# Patient Record
Sex: FEMALE | Race: OTHER | HISPANIC OR LATINO | URBAN - METROPOLITAN AREA
[De-identification: names, ages, dates, MRNs, and addresses within clinical notes are randomized per-mention and may not be internally consistent; named-entity substitution may affect disease eponyms.]

---

## 2018-04-01 ENCOUNTER — OUTPATIENT (OUTPATIENT)
Dept: OUTPATIENT SERVICES | Facility: HOSPITAL | Age: 43
LOS: 1 days | End: 2018-04-01
Payer: MEDICAID

## 2018-04-01 PROCEDURE — G9001: CPT

## 2018-04-14 DIAGNOSIS — R69 ILLNESS, UNSPECIFIED: ICD-10-CM

## 2019-12-16 ENCOUNTER — APPOINTMENT (OUTPATIENT)
Dept: SURGERY | Facility: CLINIC | Age: 44
End: 2019-12-16
Payer: MEDICAID

## 2019-12-16 VITALS
OXYGEN SATURATION: 98 % | DIASTOLIC BLOOD PRESSURE: 78 MMHG | BODY MASS INDEX: 26.03 KG/M2 | WEIGHT: 124 LBS | SYSTOLIC BLOOD PRESSURE: 114 MMHG | HEART RATE: 59 BPM | HEIGHT: 58 IN

## 2019-12-16 DIAGNOSIS — D64.9 ANEMIA, UNSPECIFIED: ICD-10-CM

## 2019-12-16 DIAGNOSIS — Z78.9 OTHER SPECIFIED HEALTH STATUS: ICD-10-CM

## 2019-12-16 DIAGNOSIS — K80.20 CALCULUS OF GALLBLADDER W/OUT CHOLECYSTITIS W/OUT OBSTRUCTION: ICD-10-CM

## 2019-12-16 PROCEDURE — 99203 OFFICE O/P NEW LOW 30 MIN: CPT

## 2019-12-16 NOTE — CONSULT LETTER
[Dear  ___] : Dear  [unfilled], [Consult Letter:] : I had the pleasure of evaluating your patient, [unfilled]. [Please see my note below.] : Please see my note below. [Sincerely,] : Sincerely, [Consult Closing:] : Thank you very much for allowing me to participate in the care of this patient.  If you have any questions, please do not hesitate to contact me. [FreeTextEntry3] : Reji Shine MD, FACS  [DrFaheem  ___] : Dr. BELL

## 2019-12-16 NOTE — PHYSICAL EXAM
[Alert] : alert [Oriented to Person] : oriented to person [Oriented to Place] : oriented to place [Oriented to Time] : oriented to time [Calm] : calm [de-identified] : She  is alert, well-groomed, and cheerful.\par   [de-identified] :   anicteric.  Nasal mucosa pink, septum midline. Oral mucosa pink.  Tongue midline, Pharynx without exudates.\par   [de-identified] :  Neck supple. Trachea midline. Thyroid isthmus barely palpable, lobes not felt.\par   [de-identified] :  mild right upper quadrant tenderness with no guarding and no rebound.

## 2019-12-16 NOTE — PLAN
[FreeTextEntry1] : Ms. ALLEN  was told significance of findings, options, risks and benefits were explained.  Informed consent for laparoscopic/possible open  cholecystectomy and potential risks, benefits and alternatives (surgical options were discussed including non-surgical options or the option of no surgery) to the planned surgery were discussed in depth.  All surgical options were discussed including non-surgical treatments.  She wishes to proceed with surgery.  We will plan for surgery on at the next available date, pending any required insurance pre-certification or pre-approval. She agrees to obtain any necessary pre-operative evaluations and testing prior to surgery.\par Patient advised to seek immediate medical attention with any acute change in symptoms or with the development of any new or worsening symptoms.  Patient's questions and concerns addressed to patient's satisfaction, and patient verbalized an understanding of the information discussed.\par \par

## 2019-12-16 NOTE — HISTORY OF PRESENT ILLNESS
[de-identified] : Ms. MATHIEU ALLEN is a 44 year  old patient who was referred by Dr. Ronald Shine  and Lisseth Uriarte with the chief complaint of having right upper quadrant and epigastric pain for 1 year.  Pain is  radiating to the back.  She reports no nausea or vomiting and no history of jaundice, acholia or choluria.   Appetite is good and weight is stable.   She   has  family history of biliary tract disease in her cousin and nephew.  She had an abdominal sonogram on  11/07/2019 which revealed multiple GB stones. CBD is normal \par \par

## 2019-12-18 ENCOUNTER — OUTPATIENT (OUTPATIENT)
Dept: OUTPATIENT SERVICES | Facility: HOSPITAL | Age: 44
LOS: 1 days | End: 2019-12-18
Payer: MEDICAID

## 2019-12-18 VITALS
HEART RATE: 72 BPM | RESPIRATION RATE: 16 BRPM | SYSTOLIC BLOOD PRESSURE: 116 MMHG | WEIGHT: 123.9 LBS | DIASTOLIC BLOOD PRESSURE: 73 MMHG | OXYGEN SATURATION: 100 % | TEMPERATURE: 98 F | HEIGHT: 58 IN

## 2019-12-18 DIAGNOSIS — K81.9 CHOLECYSTITIS, UNSPECIFIED: ICD-10-CM

## 2019-12-18 DIAGNOSIS — Z98.82 BREAST IMPLANT STATUS: Chronic | ICD-10-CM

## 2019-12-18 DIAGNOSIS — Z01.818 ENCOUNTER FOR OTHER PREPROCEDURAL EXAMINATION: ICD-10-CM

## 2019-12-18 LAB
BLD GP AB SCN SERPL QL: SIGNIFICANT CHANGE UP
HCG UR QL: NEGATIVE — SIGNIFICANT CHANGE UP
HCT VFR BLD CALC: 36.2 % — SIGNIFICANT CHANGE UP (ref 34.5–45)
HGB BLD-MCNC: 11.5 G/DL — SIGNIFICANT CHANGE UP (ref 11.5–15.5)
MCHC RBC-ENTMCNC: 28.3 PG — SIGNIFICANT CHANGE UP (ref 27–34)
MCHC RBC-ENTMCNC: 31.8 GM/DL — LOW (ref 32–36)
MCV RBC AUTO: 88.9 FL — SIGNIFICANT CHANGE UP (ref 80–100)
NRBC # BLD: 0 /100 WBCS — SIGNIFICANT CHANGE UP (ref 0–0)
PLATELET # BLD AUTO: 300 K/UL — SIGNIFICANT CHANGE UP (ref 150–400)
RBC # BLD: 4.07 M/UL — SIGNIFICANT CHANGE UP (ref 3.8–5.2)
RBC # FLD: 14.6 % — HIGH (ref 10.3–14.5)
WBC # BLD: 6.76 K/UL — SIGNIFICANT CHANGE UP (ref 3.8–10.5)
WBC # FLD AUTO: 6.76 K/UL — SIGNIFICANT CHANGE UP (ref 3.8–10.5)

## 2019-12-18 PROCEDURE — G0463: CPT

## 2019-12-18 NOTE — H&P PST ADULT - HISTORY OF PRESENT ILLNESS
45 y/o female with iron deficiency anemia due to chronic blood loss secondary to leiomyoma of uterus , now diagnosed with cholecystitis is here today for presurgical evaluation. She is scheduled for laparoscopic cholecystectomy with intra operative cholangiogram possible open 12/20/2019 45 y/o female with iron deficiency anemia due to chronic blood loss secondary to leiomyoma of uterus, now diagnosed with cholecystitis is here today for presurgical evaluation. She is scheduled for laparoscopic cholecystectomy with intra operative cholangiogram possible open 12/20/2019

## 2019-12-18 NOTE — H&P PST ADULT - NSICDXPASTMEDICALHX_GEN_ALL_CORE_FT
PAST MEDICAL HISTORY:  Iron deficiency anemia due to chronic blood loss     No pertinent past medical history     Uterine leiomyoma

## 2019-12-18 NOTE — H&P PST ADULT - PRO PAIN LIFE ADAPT
decreased activity level/inability to sleep/withdrawal from social contact/inability or reluctance to perform ADLs/inability to work

## 2019-12-18 NOTE — H&P PST ADULT - NEGATIVE GENERAL SYMPTOMS
no sweating/no anorexia/no weight loss/no polydipsia/no fatigue/no malaise/no fever/no chills/no weight gain/no polyphagia/no polyuria

## 2019-12-18 NOTE — H&P PST ADULT - ASSESSMENT
45 y/o female with iron deficiency anemia due to chronic blood loss secondary to leiomyoma of uterus , now diagnosed with cholecystitis scheduled for laparoscopic cholecystectomy with intra operative cholangiogram possible open 12/20/2019 45 y/o female with iron deficiency anemia due to chronic blood loss secondary to leiomyoma of uterus, now diagnosed with cholecystitis scheduled for laparoscopic cholecystectomy with intra operative cholangiogram possible open 12/20/2019

## 2019-12-18 NOTE — H&P PST ADULT - GASTROINTESTINAL DETAILS
soft/no distention/no masses palpable/nontender/no bruit/no rebound tenderness/no rigidity/bowel sounds normal/no organomegaly/no guarding no organomegaly/no masses palpable/bowel sounds normal/no bruit/no guarding/no distention/soft/no rebound tenderness/no rigidity

## 2019-12-18 NOTE — H&P PST ADULT - RS GEN PE MLT RESP DETAILS PC
breath sounds equal/no wheezes/clear to auscultation bilaterally/no intercostal retractions/no rales/no chest wall tenderness/no rhonchi/no subcutaneous emphysema/airway patent/good air movement/respirations non-labored

## 2019-12-18 NOTE — H&P PST ADULT - NSICDXPROBLEM_GEN_ALL_CORE_FT
PROBLEM DIAGNOSES  Problem: Cholecystitis  Assessment and Plan: Scheduled for laparoscopic cholecystectomy with intra operative cholangiogram possible open 12/20/2019. Prepoperative instructions discussed and given to patient. Verbalized understanding of same PROBLEM DIAGNOSES  Problem: Cholecystitis  Assessment and Plan: Scheduled for laparoscopic cholecystectomy with intra operative cholangiogram possible open 12/20/2019. Preoperative instructions discussed and given to patient. Verbalized understanding of same

## 2019-12-18 NOTE — H&P PST ADULT - NSANTHOSAYNRD_GEN_A_CORE
No. JAGJIT screening performed.  STOP BANG Legend: 0-2 = LOW Risk; 3-4 = INTERMEDIATE Risk; 5-8 = HIGH Risk

## 2019-12-20 ENCOUNTER — RESULT REVIEW (OUTPATIENT)
Age: 44
End: 2019-12-20

## 2019-12-20 ENCOUNTER — APPOINTMENT (OUTPATIENT)
Dept: SURGERY | Facility: HOSPITAL | Age: 44
End: 2019-12-20

## 2019-12-20 ENCOUNTER — OUTPATIENT (OUTPATIENT)
Dept: OUTPATIENT SERVICES | Facility: HOSPITAL | Age: 44
LOS: 1 days | End: 2019-12-20
Payer: MEDICAID

## 2019-12-20 VITALS
OXYGEN SATURATION: 100 % | WEIGHT: 123.9 LBS | HEIGHT: 58 IN | DIASTOLIC BLOOD PRESSURE: 71 MMHG | RESPIRATION RATE: 16 BRPM | HEART RATE: 69 BPM | SYSTOLIC BLOOD PRESSURE: 117 MMHG | TEMPERATURE: 98 F

## 2019-12-20 VITALS
HEART RATE: 66 BPM | RESPIRATION RATE: 16 BRPM | TEMPERATURE: 98 F | DIASTOLIC BLOOD PRESSURE: 74 MMHG | OXYGEN SATURATION: 98 % | SYSTOLIC BLOOD PRESSURE: 109 MMHG

## 2019-12-20 DIAGNOSIS — Z01.818 ENCOUNTER FOR OTHER PREPROCEDURAL EXAMINATION: ICD-10-CM

## 2019-12-20 DIAGNOSIS — Z98.82 BREAST IMPLANT STATUS: Chronic | ICD-10-CM

## 2019-12-20 DIAGNOSIS — K81.9 CHOLECYSTITIS, UNSPECIFIED: ICD-10-CM

## 2019-12-20 LAB — BLD GP AB SCN SERPL QL: SIGNIFICANT CHANGE UP

## 2019-12-20 PROCEDURE — 47563 LAPARO CHOLECYSTECTOMY/GRAPH: CPT | Mod: AS

## 2019-12-20 PROCEDURE — 86850 RBC ANTIBODY SCREEN: CPT

## 2019-12-20 PROCEDURE — 47563 LAPARO CHOLECYSTECTOMY/GRAPH: CPT

## 2019-12-20 PROCEDURE — 36415 COLL VENOUS BLD VENIPUNCTURE: CPT

## 2019-12-20 PROCEDURE — 86901 BLOOD TYPING SEROLOGIC RH(D): CPT

## 2019-12-20 PROCEDURE — 88304 TISSUE EXAM BY PATHOLOGIST: CPT | Mod: 26

## 2019-12-20 PROCEDURE — 86900 BLOOD TYPING SEROLOGIC ABO: CPT

## 2019-12-20 PROCEDURE — 76000 FLUOROSCOPY <1 HR PHYS/QHP: CPT

## 2019-12-20 PROCEDURE — 88304 TISSUE EXAM BY PATHOLOGIST: CPT

## 2019-12-20 RX ORDER — DOCUSATE SODIUM 100 MG
1 CAPSULE ORAL
Qty: 0 | Refills: 0 | DISCHARGE

## 2019-12-20 RX ORDER — SODIUM CHLORIDE 9 MG/ML
1000 INJECTION, SOLUTION INTRAVENOUS
Refills: 0 | Status: DISCONTINUED | OUTPATIENT
Start: 2019-12-20 | End: 2019-12-28

## 2019-12-20 RX ORDER — ERGOCALCIFEROL 1.25 MG/1
1 CAPSULE ORAL
Qty: 0 | Refills: 0 | DISCHARGE

## 2019-12-20 RX ORDER — OXYCODONE AND ACETAMINOPHEN 5; 325 MG/1; MG/1
1 TABLET ORAL EVERY 6 HOURS
Refills: 0 | Status: DISCONTINUED | OUTPATIENT
Start: 2019-12-20 | End: 2019-12-20

## 2019-12-20 RX ORDER — SODIUM CHLORIDE 9 MG/ML
1000 INJECTION, SOLUTION INTRAVENOUS
Refills: 0 | Status: DISCONTINUED | OUTPATIENT
Start: 2019-12-20 | End: 2019-12-20

## 2019-12-20 RX ORDER — HYDROMORPHONE HYDROCHLORIDE 2 MG/ML
0.5 INJECTION INTRAMUSCULAR; INTRAVENOUS; SUBCUTANEOUS
Refills: 0 | Status: DISCONTINUED | OUTPATIENT
Start: 2019-12-20 | End: 2019-12-20

## 2019-12-20 RX ORDER — SODIUM CHLORIDE 9 MG/ML
3 INJECTION INTRAMUSCULAR; INTRAVENOUS; SUBCUTANEOUS EVERY 8 HOURS
Refills: 0 | Status: DISCONTINUED | OUTPATIENT
Start: 2019-12-20 | End: 2019-12-20

## 2019-12-20 RX ORDER — IRON POLYSACCHARIDE COMPLEX 150 MG
1 CAPSULE ORAL
Qty: 0 | Refills: 0 | DISCHARGE

## 2019-12-20 RX ADMIN — HYDROMORPHONE HYDROCHLORIDE 0.5 MILLIGRAM(S): 2 INJECTION INTRAMUSCULAR; INTRAVENOUS; SUBCUTANEOUS at 11:26

## 2019-12-20 RX ADMIN — HYDROMORPHONE HYDROCHLORIDE 0.5 MILLIGRAM(S): 2 INJECTION INTRAMUSCULAR; INTRAVENOUS; SUBCUTANEOUS at 11:41

## 2019-12-20 RX ADMIN — SODIUM CHLORIDE 3 MILLILITER(S): 9 INJECTION INTRAMUSCULAR; INTRAVENOUS; SUBCUTANEOUS at 08:29

## 2019-12-20 NOTE — ASU PATIENT PROFILE, ADULT - PMH
Iron deficiency anemia due to chronic blood loss    No pertinent past medical history    Uterine leiomyoma

## 2019-12-20 NOTE — ASU DISCHARGE PLAN (ADULT/PEDIATRIC) - CARE PROVIDER_API CALL
Reji Shine)  Surgery  [address] [facility], [place] [address]  Phone: [phone]  Fax: [phone]  Follow Up Time:

## 2019-12-20 NOTE — ASU PATIENT PROFILE, ADULT - TEACHING/LEARNING RELIGIOUS CONSIDERATIONS
Chief complaint:   Chief Complaint   Patient presents with   • Follow-up     1week- wound check       Vitals:  Visit Vitals   • /72   • Pulse 95   • Temp 97 °F (36.1 °C) (Tympanic)   • Ht 5' 7\" (1.702 m)   • Wt 97 kg   • SpO2 97%   • BMI 33.49 kg/m2     Wt Readings from Last 10 Encounters:   03/13/17 97 kg   03/06/17 97 kg   02/26/17 97.1 kg   02/06/17 97 kg   01/31/17 96 kg   01/09/17 96.2 kg   10/03/16 91.2 kg   09/07/16 91.4 kg   05/03/16 95.4 kg   02/10/16 98 kg       HISTORY OF PRESENT ILLNESS     HPI Comments: Here today for wound follow up on infected sebaceous cyst x 2, mid back.        Was seen here on 3/6, I&D performed x 2.  Denies any fevers, or discomforts.  Wound had been draining, but not for the last couple days.      Other significant problems:  Patient Active Problem List    Diagnosis Date Noted   • Moderate obstructive sleep apnea 03/01/2017     Priority: Low     Feb 2017: Sleep titration test.     • Hyperlipidemia, mixed 01/09/2017     Priority: Low   • Pulmonary nodule, left 01/09/2017     Priority: Low   • Centrilobular emphysema 10/04/2016     Priority: Low     10/4/16 CT chest     • Mild neurocognitive disorder 05/03/2016     Priority: Low     1/2017:  [employer] [facility] Exam:    Total score 24.     • Cervical radiculopathy 02/17/2016     Priority: Low   • Hypertrophy of prostate with urinary obstruction and other lower urinary tract symptoms (LUTS) 01/30/2014     Priority: Low     1/29/2014:  Per Dr. Herbert letter:  Samples of PDE 5 inhibitors given.     • Erectile dysfunction 05/28/2013     Priority: Low     Sees .   Plans to try Penile Vacuum Pumo.     • Unspecified vitamin D deficiency 11/21/2012     Priority: Low   • CKD (chronic kidney disease), stage I 11/21/2012     Priority: Low   • Diabetes mellitus with renal manifestations, controlled 11/21/2012     Priority: Low   • Benign hypertensive kidney disease with chronic kidney disease stage I through  stage IV, or unspecified 11/21/2012     Priority: Low   • Other chronic nonalcoholic liver disease 11/21/2012     Priority: Low   • Benign neoplasm of colon 11/21/2012     Priority: Low   • Prostate cancer 11/21/2012     Priority: Low     Brachytherapy         PAST MEDICAL, FAMILY AND SOCIAL HISTORY     Medications:  Current Outpatient Prescriptions   Medication   • blood glucose (ACCU-CHEK MIKALA PLUS) test strip   • spironolactone (ALDACTONE) 25 MG tablet   • TOUJEO SOLOSTAR 300 UNIT/ML pen-injector   • pravastatin (PRAVACHOL) 10 MG tablet   • metformin (GLUCOPHAGE) 1000 MG tablet   • Insulin Pen Needle (BD PEN NEEDLE SONDRA U/F) 32G X 4 MM Misc   • vitamin - therapeutic multivitamins w/minerals (CENTRUM SILVER,THERA-M) Tab   • Blood Glucose Monitoring Suppl (ACCU-CHEK MIKALA PLUS) W/DEVICE KIT   • Cholecalciferol (VITAMIN D) 2000 UNITS CAPS     No current facility-administered medications for this visit.        Allergies:  ALLERGIES:   Allergen Reactions   • Aspirin        Past Medical  History/Surgeries:  Past Medical History:   Diagnosis Date   • Arthritis     Hands, hips.  Patient reported.   • Basal cell carcinoma 09/16/2016    Skin, right medial cheek   • Cataract     No surgical intervention as of 7/14/2014.  Dr. Sandoval   • Centrilobular emphysema 10/4/2016   • Cervical radiculopathy 2/17/2016   • Diabetes Mellitus    • ED (erectile dysfunction)    • GERD     Resolved.  Has known food triggers   • Hyperlipidemia    • Hypertension    • Prostate Cancer        Past Surgical History:   Procedure Laterality Date   • APPENDECTOMY  1952   • CARPAL TUNNEL RELEASE  2010    right   • CHOLECYSTECTOMY  2011    Dr. Reza   • COLONOSCOPY DIAGNOSTIC  08/24/2011    Repeat due 10/13/2015.  Per GI.   • EYE SURGERY  2004    Lasik surgery.  Dr. Breaux   • FECAL OCCULT BLOOD (IMMUNOCHEM)  07/29/2011   • INTRACAVITY RADIATION SOURCE APPLICATION  2011    Prostate, Dr ERIKA Jamil   • NASAL SEPTUM SURGERY  2011    Dr. RYNE Farrell   •  PROSTATE BIOPSY  09/06/2011   • REMOVAL OF TONSILS,<11 Y/O      Tonsillectomy Alone       Family History:  Family History   Problem Relation Age of Onset   • COPD Father    • Arthritis Father    • Cancer Sister      brain   • Diabetes Brother    • Arthritis Mother    • Asthma Mother        Social History:  Social History   Substance Use Topics   • Smoking status: Current Every Day Smoker     Packs/day: 1.00     Years: 35.00     Types: Cigars     Last attempt to quit: 7/14/1987   • Smokeless tobacco: Never Used      Comment: Currently smoke cigars   • Alcohol use 0.0 oz/week     0 Standard drinks or equivalent per week      Comment: Rare, 2-3 times monthly       REVIEW OF SYSTEMS     Review of Systems   Constitutional: Negative for fever.   HENT: Positive for congestion (sinus stuffiness at times.  Using Cpap and the nasal congestion seems to be a little bothersome.).    Respiratory: Negative for cough.    Skin: Positive for wound (x2, cysts, mid back).       PHYSICAL EXAM     Physical Exam   Constitutional: He is oriented to person, place, and time. He appears well-developed and well-nourished.   Neurological: He is alert and oriented to person, place, and time.   Skin: Skin is warm and dry. No erythema.   Incisions x 2 on mid back healing from I&D, 3/6, as expected.  No erythema, no drainage, no tenderness.   Vitals reviewed.      ASSESSMENT/PLAN     (L72.3,  L08.9) Infected sebaceous cyst  (primary encounter diagnosis)  Comment: healing as expected from I&D, 3/6  Plan:   Advised to watch for signs of infection:  Fever, drainage, redness, or increased pain.    Reports occasional nasal congestion:    See patient instructions:  For nasal congestion:      May use Flonase, over the counter, use as directed.  May use saline nasal rinse, such as Neti Pot.  May use Mucinex 600mg daily, as needed.      Dr. Christa Alarcon  Dermatologist  [facility] [address]  [phone]    Please call  for an appointment as needed.      Return to Clinic:  As needed.  Verbalized understanding of current treatment plan.     none

## 2019-12-20 NOTE — ASU PATIENT PROFILE, ADULT - PRO INTERPRETER NEED 2
Patient notified, she states she will get her TSH lab done when she comes back from vacation   English

## 2019-12-23 PROBLEM — D50.0 IRON DEFICIENCY ANEMIA SECONDARY TO BLOOD LOSS (CHRONIC): Chronic | Status: ACTIVE | Noted: 2019-12-18

## 2019-12-23 PROBLEM — D25.9 LEIOMYOMA OF UTERUS, UNSPECIFIED: Chronic | Status: ACTIVE | Noted: 2019-12-18

## 2019-12-27 LAB — SURGICAL PATHOLOGY STUDY: SIGNIFICANT CHANGE UP

## 2019-12-30 PROBLEM — K81.9 CHOLECYSTITIS: Status: ACTIVE | Noted: 2019-12-16

## 2020-01-06 ENCOUNTER — APPOINTMENT (OUTPATIENT)
Dept: SURGERY | Facility: CLINIC | Age: 45
End: 2020-01-06
Payer: MEDICAID

## 2020-01-06 DIAGNOSIS — K81.9 CHOLECYSTITIS, UNSPECIFIED: ICD-10-CM

## 2020-01-06 PROCEDURE — 99024 POSTOP FOLLOW-UP VISIT: CPT

## 2020-01-06 NOTE — ASSESSMENT
[FreeTextEntry1] : Ms. ALLEN is doing well, with excellent post-operative recovery. All surgical incisions are healing well and as expected. There is no evidence of infection or complication, and she is progressing as expected. Post-operative wound care, activity, restrictions and precautions reinforced. Patient instructed to refrain from any heavy lifting greater than 10-15 pounds for at least 4  weeks post-operatively. path discussed.  Patient's questions and concerns addressed to patient's satisfaction.\par

## 2020-01-06 NOTE — PHYSICAL EXAM
[Calm] : calm [de-identified] : Surgical wounds are  healing well.   no signs of  inflammation or infection.  [de-identified] : She  is alert, well-groomed, and cheerful.\par

## 2020-01-06 NOTE — PLAN
[FreeTextEntry1] : Ms. ALLEN will follow up  if needed. Warning signs, follow up, and restrictions were discussed with the patient.

## 2020-01-06 NOTE — HISTORY OF PRESENT ILLNESS
[de-identified] : Ms. ALLEN  is s/p laparoscopic  cholecystectomy   on 12/20/2019.  Today Ms. ALLEN offers no complaints. patient reports no fever, chills,  or  pain. Her  surgical wounds are  healing well. No signs of inflammation, infection or exudate. Patient reports good bowel movements and appetite.

## 2020-03-09 ENCOUNTER — APPOINTMENT (OUTPATIENT)
Dept: SURGERY | Facility: CLINIC | Age: 45
End: 2020-03-09
Payer: MEDICAID

## 2020-03-09 VITALS
WEIGHT: 125 LBS | TEMPERATURE: 98.4 F | HEIGHT: 58 IN | BODY MASS INDEX: 26.24 KG/M2 | SYSTOLIC BLOOD PRESSURE: 104 MMHG | DIASTOLIC BLOOD PRESSURE: 68 MMHG | OXYGEN SATURATION: 98 % | HEART RATE: 67 BPM

## 2020-03-09 DIAGNOSIS — K43.2 INCISIONAL HERNIA W/OUT OBSTRUCTION OR GANGRENE: ICD-10-CM

## 2020-03-09 PROCEDURE — 99213 OFFICE O/P EST LOW 20 MIN: CPT | Mod: 24

## 2020-03-09 NOTE — HISTORY OF PRESENT ILLNESS
[de-identified] : Ms. ALLEN  is s/p laparoscopic  cholecystectomy   on 12/20/2019.   she returns today  with the chief complaint of having an umbilical hernia.  She reports having this condition for  few months. She denies any trauma to the area, fever, nausea, vomiting, distension, night sweats and  loss of appetite.  Symptoms aggravated by cough and straining.  - She reports normal bowel movements   -She  reports  previous  laparoscopic  cholecystectomy  and abdominoplasty \par  \par \par \par

## 2020-03-09 NOTE — PLAN
[FreeTextEntry1] : Ms. ALLEN  was told significance of findings, options, risks and benefits were explained.  Informed consent for incisional hernia repair and potential risks, benefits and alternatives (surgical options were discussed including non-surgical options or the option of no surgery) to the planned surgery were discussed in depth.  All surgical options were discussed including non-surgical treatments. patient was made aware that even after the hernia repair  cosmetic appearance of the bulge might not change.  she was offered to see plastic surgeon.   she wants to think about it and will return when she decides \par Patient advised to seek immediate medical attention with any acute change in symptoms or with the development of any new or worsening symptoms.  Patient's questions and concerns addressed to patient's satisfaction, and patient verbalized an understanding of the information discussed.\par \par

## 2020-03-09 NOTE — PHYSICAL EXAM
[Abdominal Masses] : No abdominal masses [Abdomen Tenderness] : ~T ~M No abdominal tenderness [Calm] : calm [de-identified] : She  is alert, well-groomed, and cheerful.\par   [de-identified] :   anicteric.  Nasal mucosa pink, septum midline. Oral mucosa pink.  Tongue midline, Pharynx without exudates.\par   [de-identified] :  Neck supple. Trachea midline. Thyroid isthmus barely palpable, lobes not felt.\par   [de-identified] :   reducible  incisional hernia, non-tender.  The defect appears to be relatively small and the skin overlying the hernia is normal. palpable stitch
